# Patient Record
Sex: MALE | URBAN - METROPOLITAN AREA
[De-identification: names, ages, dates, MRNs, and addresses within clinical notes are randomized per-mention and may not be internally consistent; named-entity substitution may affect disease eponyms.]

---

## 2017-07-02 ENCOUNTER — NURSE TRIAGE (OUTPATIENT)
Dept: ADMINISTRATIVE | Facility: CLINIC | Age: 27
End: 2017-07-02

## 2017-07-02 NOTE — TELEPHONE ENCOUNTER
"    Reason for Disposition   Back pain or stiffness from bending or twisting injury (all triage questions negative)    Protocols used: ST BACK INJURY-A-AH    Patient describes what he calls back strain or pull about a week ago. He is using ibuprofen and ice and heat, but the pain is getting worse. He describes it as being off center of the spine, underneath his "lat".   "